# Patient Record
Sex: MALE | Race: OTHER | Employment: FULL TIME | URBAN - METROPOLITAN AREA
[De-identification: names, ages, dates, MRNs, and addresses within clinical notes are randomized per-mention and may not be internally consistent; named-entity substitution may affect disease eponyms.]

---

## 2024-06-10 ENCOUNTER — HOSPITAL ENCOUNTER (EMERGENCY)
Age: 24
Discharge: HOME | End: 2024-06-10
Attending: EMERGENCY MEDICINE

## 2024-06-10 VITALS
RESPIRATION RATE: 16 BRPM | HEART RATE: 98 BPM | TEMPERATURE: 97.5 F | OXYGEN SATURATION: 95 % | SYSTOLIC BLOOD PRESSURE: 147 MMHG | DIASTOLIC BLOOD PRESSURE: 88 MMHG

## 2024-06-10 DIAGNOSIS — T50.904A OVERDOSE OF UNDETERMINED INTENT, INITIAL ENCOUNTER: Primary | ICD-10-CM

## 2024-06-10 PROCEDURE — 96374 THER/PROPH/DIAG INJ IV PUSH: CPT

## 2024-06-10 PROCEDURE — 6370000000 HC RX 637 (ALT 250 FOR IP)

## 2024-06-10 PROCEDURE — 6360000002 HC RX W HCPCS

## 2024-06-10 PROCEDURE — 99284 EMERGENCY DEPT VISIT MOD MDM: CPT

## 2024-06-10 PROCEDURE — 2580000003 HC RX 258

## 2024-06-10 RX ORDER — SODIUM CHLORIDE, SODIUM LACTATE, POTASSIUM CHLORIDE, AND CALCIUM CHLORIDE .6; .31; .03; .02 G/100ML; G/100ML; G/100ML; G/100ML
1000 INJECTION, SOLUTION INTRAVENOUS ONCE
Status: COMPLETED | OUTPATIENT
Start: 2024-06-10 | End: 2024-06-10

## 2024-06-10 RX ORDER — NALOXONE HYDROCHLORIDE 4 MG/.1ML
1 SPRAY NASAL ONCE
Status: COMPLETED | OUTPATIENT
Start: 2024-06-10 | End: 2024-06-10

## 2024-06-10 RX ORDER — ONDANSETRON 2 MG/ML
4 INJECTION INTRAMUSCULAR; INTRAVENOUS ONCE
Status: COMPLETED | OUTPATIENT
Start: 2024-06-10 | End: 2024-06-10

## 2024-06-10 RX ADMIN — ONDANSETRON 4 MG: 2 INJECTION INTRAMUSCULAR; INTRAVENOUS at 01:20

## 2024-06-10 RX ADMIN — SODIUM CHLORIDE, POTASSIUM CHLORIDE, SODIUM LACTATE AND CALCIUM CHLORIDE 1000 ML: 600; 310; 30; 20 INJECTION, SOLUTION INTRAVENOUS at 01:20

## 2024-06-10 ASSESSMENT — ENCOUNTER SYMPTOMS
VOMITING: 0
NAUSEA: 1
SHORTNESS OF BREATH: 0
ABDOMINAL PAIN: 0

## 2024-06-10 ASSESSMENT — PAIN - FUNCTIONAL ASSESSMENT: PAIN_FUNCTIONAL_ASSESSMENT: NONE - DENIES PAIN

## 2024-06-10 NOTE — ED NOTES
Per intrepreter pt admits to snorting cocaine earlier tonight. EMS was originally dispatched to scene at 2030 on 06/09/2024. Pt currently with pin point pupils and reports he is very tired. Pt denies any pain at this time. Pt placed on cardiac monitor, IV established and labs obtained.

## 2024-06-10 NOTE — ED NOTES
All discharge instructions and use of narcan kit explained to pt using a , all questions answered at this time.

## 2024-06-10 NOTE — DISCHARGE INSTRUCTIONS
You were seen and evaluated [facility] emergency department on 6/9/2024 for concerns of drug overdose.  You snorted an unknown substance.  You received Narcan by EMS.  For rest your vital signs have been stable.  You received antinausea medication and fluids.  Please follow-up with PCP by calling to schedule an appointment.  If you do not have 1 attached is contact information become established.  Please return to the ED with any new or worsening symptoms including chest pain, lightheadedness, dizziness, passing out, inability ambulate, altered mental status, or any other concerns.    You were given a Narcan kit. Please keep with you/your friends/family if drugs are being used.

## 2024-06-10 NOTE — ED NOTES
Pt resting in cot with eyes closed-respirations even and non-labored. Pt arouses to verbal stimuli. Pt drinking water without difficulty when awake.

## 2024-06-10 NOTE — ED PROVIDER NOTES
[facility]   Emergency Department  Faculty Attestation       I performed a history and physical examination of the patient and discussed management with the resident. I reviewed the resident’s note and agree with the documented findings including all diagnostic interpretations and plan of care. Any areas of disagreement are noted on the chart. I was personally present for the key portions of any procedures. I have documented in the chart those procedures where I was not present during the key portions. I have reviewed the emergency nurses triage note. I agree with the chief complaint, past medical history, past surgical history, allergies, medications, social and family history as documented unless otherwise noted below.  For Physician Assistant/ Nurse Practitioner cases/documentation I have personally evaluated this patient and have completed at least one if not all key elements of the E/M (history, physical exam, and MDM). Additional findings are as noted.    Patient Name: Ramírez Matamoros  MRN: [id number]  : 2000  Primary Care Physician: No primary care provider on file.    Date of evaluationa: 6/10/2024   Note Started: 3:20 AM EDT    Pertinent Comments     Chief Complaint:   Chief Complaint   Patient presents with   • Drug Overdose     Pt reports he was drinking and may have ingested something that made him sleepy. EMS was contacted earlier for pt d/t possible overdose, given unknown amount of IN narcan and was awakened. Pt initially refused EMS transport, but reports he still \"does not feel right\". Pt is Kazakh speaking only, video  used for translation.         Initial vitals: (If not listed, please see nursing documentation)  ED Triage Vitals   BP Temp Temp Source Pulse Respirations SpO2 Height Weight   06/10/24 0046 06/10/24 0046 06/10/24 0046 06/10/24 0046 06/10/24 0050 06/10/24 0050 -- --   (!) 149/98 97.5 °F (36.4 °C) Oral 86 10 95 %          HPI/PE/Impression:  This

## 2024-06-10 NOTE — ED NOTES
SW met with pt using Interpretor. Pt going to [address] [place] [address] Trip # [id number]. Pt came in for overdose so [facility] Crisis phone number given to him for AOD resources. [employer] voucher utilized due to pt not having insurance. Trip  # [phone]

## 2024-06-10 NOTE — ED NOTES
Dr. Salazar at bedside to re-evaluate pt.   Pt given boxed lunch, encouraged to eat.   Call light within reach.

## 2024-06-10 NOTE — ED PROVIDER NOTES
[facility] ED  Emergency Department Encounter  Emergency Medicine Resident     Pt Name:Ramírez Matamoros  MRN: [id number]  Birthdate 2000  Date of evaluation: 6/10/24  PCP:  No primary care provider on file.  Note Started: 12:55 AM EDT      CHIEF COMPLAINT       Chief Complaint   Patient presents with    Drug Overdose     Pt reports he was drinking and may have ingested something that made him sleepy. EMS was contacted earlier for pt d/t possible overdose, given unknown amount of IN narcan and was awakened. Pt initially refused EMS transport, but reports he still \"does not feel right\". Pt is Slovak speaking only, video  used for translation.        HISTORY OF PRESENT ILLNESS  (Location/Symptom, Timing/Onset, Context/Setting, Quality, Duration, Modifying Factors, Severity.)      Ramírez Matamoros is a 24 y.o. male who after concerns for drug overdose.  EMS was called earlier due to group of people for potential overdose and given intranasal Narcan and was awakened.  Patient signed out AMA with EMS at 8:30 PM.  Patient called EMS for still not feeling well.  Denying any complaints at this time.    PAST MEDICAL / SURGICAL / SOCIAL / FAMILY HISTORY      has no past medical history on file.       has no past surgical history on file.      Social History     Socioeconomic History    Marital status: Single     Spouse name: Not on file    Number of children: Not on file    Years of education: Not on file    Highest education level: Not on file   Occupational History    Not on file   Tobacco Use    Smoking status: Not on file    Smokeless tobacco: Not on file   Substance and Sexual Activity    Alcohol use: Not on file    Drug use: Not on file    Sexual activity: Not on file   Other Topics Concern    Not on file   Social History Narrative    Not on file     Social Determinants of Health     Financial Resource Strain: Not on file   Food Insecurity: Not on file   Transportation Needs: Not on